# Patient Record
Sex: MALE | Race: WHITE | NOT HISPANIC OR LATINO | Employment: UNEMPLOYED | URBAN - METROPOLITAN AREA
[De-identification: names, ages, dates, MRNs, and addresses within clinical notes are randomized per-mention and may not be internally consistent; named-entity substitution may affect disease eponyms.]

---

## 2017-02-17 ENCOUNTER — HOSPITAL ENCOUNTER (EMERGENCY)
Facility: HOSPITAL | Age: 22
Discharge: HOME | End: 2017-02-17
Attending: EMERGENCY MEDICINE | Admitting: EMERGENCY MEDICINE

## 2017-02-17 VITALS
WEIGHT: 198 LBS | HEIGHT: 69 IN | BODY MASS INDEX: 29.33 KG/M2 | HEART RATE: 73 BPM | OXYGEN SATURATION: 95 % | RESPIRATION RATE: 16 BRPM | DIASTOLIC BLOOD PRESSURE: 96 MMHG | SYSTOLIC BLOOD PRESSURE: 117 MMHG | TEMPERATURE: 97.9 F

## 2017-02-17 DIAGNOSIS — IMO0001 PARESTHESIAS/NUMBNESS: Primary | ICD-10-CM

## 2017-02-17 DIAGNOSIS — E87.6 HYPOKALEMIA: ICD-10-CM

## 2017-02-17 LAB
ANION GAP SERPL CALCULATED.3IONS-SCNC: 11.2 MMOL/L
BUN BLD-MCNC: 12 MG/DL (ref 6–20)
BUN/CREAT SERPL: 14.5 (ref 7–25)
CALCIUM SPEC-SCNC: 9.5 MG/DL (ref 8.6–10.5)
CHLORIDE SERPL-SCNC: 102 MMOL/L (ref 98–107)
CO2 SERPL-SCNC: 27.8 MMOL/L (ref 22–29)
CREAT BLD-MCNC: 0.83 MG/DL (ref 0.76–1.27)
GFR SERPL CREATININE-BSD FRML MDRD: 119 ML/MIN/1.73
GLUCOSE BLD-MCNC: 116 MG/DL (ref 65–99)
POTASSIUM BLD-SCNC: 3.4 MMOL/L (ref 3.5–5.2)
SODIUM BLD-SCNC: 141 MMOL/L (ref 136–145)

## 2017-02-17 PROCEDURE — 93010 ELECTROCARDIOGRAM REPORT: CPT | Performed by: INTERNAL MEDICINE

## 2017-02-17 PROCEDURE — 99284 EMERGENCY DEPT VISIT MOD MDM: CPT

## 2017-02-17 PROCEDURE — 36415 COLL VENOUS BLD VENIPUNCTURE: CPT

## 2017-02-17 PROCEDURE — 80048 BASIC METABOLIC PNL TOTAL CA: CPT | Performed by: EMERGENCY MEDICINE

## 2017-02-17 PROCEDURE — 93005 ELECTROCARDIOGRAM TRACING: CPT | Performed by: EMERGENCY MEDICINE

## 2017-02-17 RX ORDER — POTASSIUM CHLORIDE 750 MG/1
40 CAPSULE, EXTENDED RELEASE ORAL ONCE
Status: COMPLETED | OUTPATIENT
Start: 2017-02-17 | End: 2017-02-17

## 2017-02-17 RX ADMIN — POTASSIUM CHLORIDE 40 MEQ: 750 CAPSULE, EXTENDED RELEASE ORAL at 19:41

## 2017-02-17 NOTE — ED NOTES
Tingling all over sudden onset at 1500 while sitting in the car. Denies chest pain, states he has not been working outside or over heated. Pt has hx of anxiety and panic attacks states it feels different this time.     James Zarate RN  02/17/17 5094

## 2017-02-17 NOTE — ED PROVIDER NOTES
EMERGENCY DEPARTMENT ENCOUNTER    CHIEF COMPLAINT  Chief Complaint: tingling   History given by: pt, pt's aunt  History limited by: none  Room Number: 11/11  PMD: LESA Murray      HPI:  Pt is a 21 y.o. male who presents complaining of tingling and numbness all over onset two hours ago while sitting in the car. He reports the tingling began in his abd and then went all over his body. Tingling and numbness have now resolved and lasted about 2 hours. He c/o cough onset last night. He denies fever, CP, weight change, vomiting and other complaints at this time. Hx of panic attacks and heart surgery (PFO repair) as a child.    Duration:  2 hours   Onset: gradual   Timing: constant   Location: generalized   Radiation: none  Quality: numbness and tingling  Intensity/Severity: moderate   Progression: resolved   Associated Symptoms: cough onset last night   Aggravating Factors: none  Alleviating Factors: none  Previous Episodes: Pt reports hx of panic attacks.   Treatment before arrival: none reported     PAST MEDICAL HISTORY  Active Ambulatory Problems     Diagnosis Date Noted   • No Active Ambulatory Problems     Resolved Ambulatory Problems     Diagnosis Date Noted   • No Resolved Ambulatory Problems     Past Medical History   Diagnosis Date   • Anxiety        PAST SURGICAL HISTORY  History reviewed. No pertinent past surgical history.    FAMILY HISTORY  History reviewed. No pertinent family history.    SOCIAL HISTORY  Social History     Social History   • Marital status: Single     Spouse name: N/A   • Number of children: N/A   • Years of education: N/A     Occupational History   • Not on file.     Social History Main Topics   • Smoking status: Current Every Day Smoker     Packs/day: 1.00     Types: Cigarettes   • Smokeless tobacco: Not on file   • Alcohol use No   • Drug use: Not on file   • Sexual activity: Not on file     Other Topics Concern   • Not on file     Social History Narrative   • No  narrative on file       ALLERGIES  Amoxicillin and Penicillins    REVIEW OF SYSTEMS  Review of Systems   Constitutional: Negative.  Negative for fever and unexpected weight change.   Respiratory: Positive for cough. Negative for shortness of breath.    Cardiovascular: Negative.  Negative for chest pain.   Gastrointestinal: Negative.  Negative for abdominal pain.   Genitourinary: Negative.    Musculoskeletal: Negative.    Neurological: Positive for numbness (and tingling).   All other systems reviewed and are negative.      PHYSICAL EXAM  ED Triage Vitals   Temp Heart Rate Resp BP SpO2   02/17/17 1614 02/17/17 1614 02/17/17 1614 02/17/17 1629 02/17/17 1614   98.7 °F (37.1 °C) 78 18 155/72 98 %      Temp src Heart Rate Source Patient Position BP Location FiO2 (%)   02/17/17 1614 02/17/17 1614 02/17/17 1629 02/17/17 1629 --   Tympanic Monitor Sitting Right arm        Physical Exam   Constitutional: He is oriented to person, place, and time and well-developed, well-nourished, and in no distress.   HENT:   Head: Normocephalic and atraumatic.   Mouth/Throat: Oropharynx is clear and moist.   Eyes: EOM are normal. Pupils are equal, round, and reactive to light.   Neck: Normal range of motion. Neck supple.   Cardiovascular: Normal rate, regular rhythm and normal heart sounds.    No murmur heard.  Pulmonary/Chest: Effort normal and breath sounds normal. No respiratory distress.   Abdominal: Soft. There is no tenderness. There is no rebound and no guarding.   Musculoskeletal: Normal range of motion. He exhibits no edema.   Neurological: He is alert and oriented to person, place, and time. He has normal sensation and normal strength.   Skin: Skin is warm and dry.   Psychiatric: Mood and affect normal.   Nursing note and vitals reviewed.      LAB RESULTS  Lab Results (last 24 hours)     Procedure Component Value Units Date/Time    Basic Metabolic Panel [[id number]]  (Abnormal) Collected:  02/17/17 1759    Specimen:  Blood Updated:   02/17/17 1831     Glucose 116 (H) mg/dL      BUN 12 mg/dL      Creatinine 0.83 mg/dL      Sodium 141 mmol/L      Potassium 3.4 (L) mmol/L      Chloride 102 mmol/L      CO2 27.8 mmol/L      Calcium 9.5 mg/dL      eGFR Non African Amer 119 mL/min/1.73      BUN/Creatinine Ratio 14.5      Anion Gap 11.2 mmol/L     Narrative:       GFR Normal >60  Chronic Kidney Disease <60  Kidney Failure <15          I ordered the above labs and reviewed the results  PROCEDURES  Procedures  EKG           EKG time: 17:43  Rhythm/Rate: NSR, 80  P waves and MI: normal  QRS, axis: normal   ST and T waves: normal     Interpreted Contemporaneously by me, independently viewed  No prior available       PROGRESS AND CONSULTS  ED Course   Comment By Time   5:40 PM  22 yo with prior heart surgery as infant (PFO repair) presents after having about 1.5 hrs on generalized body tingling, now resolved.  Exam currently benign, no murmurs.  Will check EKG, labs. Mervin Thorpe MD 02/17 1741     17:37 Ordered blood work and EKG for further evaluation.       MEDICAL DECISION MAKING  Results were reviewed/discussed with the patient and they were also made aware of online access. Pt also made aware that some labs, such as cultures, will not be resulted during ER visit and follow up with PMD is necessary.     MDM  Number of Diagnoses or Management Options  Hypokalemia:   Paresthesias/numbness:      Amount and/or Complexity of Data Reviewed  Clinical lab tests: ordered and reviewed (Hypokalemia - K of 3.4)  Tests in the medicine section of CPT®: reviewed and ordered (EKG - normal, NSR rate 80)  Decide to obtain previous medical records or to obtain history from someone other than the patient: yes  Obtain history from someone other than the patient: yes (Pt's aunt  )  Review and summarize past medical records: yes (No prior visits to [facility] ED.)    Patient Progress  Patient progress: stable         DIAGNOSIS  Final diagnoses:   Paresthesias/numbness    Hypokalemia       DISPOSITION  DISCHARGE    Patient discharged in stable condition.    Reviewed implications of results, diagnosis, meds, responsibility to follow up, warning signs and symptoms of possible worsening, potential complications and reasons to return to the ED.    Patient/Family voiced understanding of above instructions.    Discussed plan for discharge, as there is no emergent indication for admission.  Pt/family is agreeable and understands need for follow up and repeat testing.  Pt is aware that discharge does not mean that nothing is wrong but it indicates no emergency is present that requires admission and they must continue care with follow-up as given below or physician of their choice.     FOLLOW-UP  Payton Mercado, APRN  [address] [address]  [place] [address]  [phone]    In 3 days  If Not Better         Medication List      Notice     No changes were made to your prescriptions during this visit.            Latest Documented Vital Signs:  As of 6:46 PM  BP- 147/61 HR- 83 Temp- 98.8 °F (37.1 °C) (Tympanic) O2 sat- 98%    --  Documentation assistance provided by monty Mack for Dr. Boothe.  Information recorded by the scribe was done at my direction and has been verified and validated by me.        Dee Mack  02/17/17 8276       Mervin Thorpe MD  02/17/17 8310

## 2017-06-06 ENCOUNTER — OFFICE VISIT (OUTPATIENT)
Dept: SURGERY | Facility: CLINIC | Age: 22
End: 2017-06-06

## 2017-06-06 VITALS
HEIGHT: 69 IN | DIASTOLIC BLOOD PRESSURE: 72 MMHG | SYSTOLIC BLOOD PRESSURE: 116 MMHG | BODY MASS INDEX: 29.47 KG/M2 | WEIGHT: 199 LBS

## 2017-06-06 DIAGNOSIS — R10.33 PERIUMBILICAL PAIN: Primary | ICD-10-CM

## 2017-06-06 PROCEDURE — 99202 OFFICE O/P NEW SF 15 MIN: CPT | Performed by: SURGERY

## 2017-06-06 NOTE — PROGRESS NOTES
"    PATIENT INFORMATION  Darien Stanley  Possible umb hernia x 1 year, painful, has increased in size, no imaging     - 1995    CHIEF COMPLAINT  Chief Complaint   Patient presents with   • Hernia       HISTORY OF PRESENT ILLNESS  HPI  Complains of pain around his navel for one year.  Says is some burning sensation.  He denies any swelling in the area.  This has not been imaged.      REVIEW OF SYSTEMS  Review of Systems   Constitutional: Negative.    Eyes: Negative.    Respiratory: Negative.    Cardiovascular: Negative.    Gastrointestinal: Positive for abdominal pain.   Endocrine: Negative.    Genitourinary: Negative.    Musculoskeletal: Negative.    Skin: Negative.    Allergic/Immunologic: Negative.    Neurological: Positive for headaches.   Hematological: Negative.    Psychiatric/Behavioral: The patient is nervous/anxious.          ACTIVE PROBLEMS  There are no active problems to display for this patient.        PAST MEDICAL HISTORY  Past Medical History:   Diagnosis Date   • Anxiety          SURGICAL HISTORY  History reviewed. No pertinent surgical history.      FAMILY HISTORY  Family History   Problem Relation Age of Onset   • Diabetes Maternal Grandmother    • Hypertension Maternal Grandmother          SOCIAL HISTORY  Social History     Occupational History   • Not on file.     Social History Main Topics   • Smoking status: Current Every Day Smoker     Packs/day: 1.00     Types: Cigarettes   • Smokeless tobacco: Not on file   • Alcohol use No   • Drug use: Not on file   • Sexual activity: Not on file         CURRENT MEDICATIONS  No current outpatient prescriptions on file.    ALLERGIES  Amoxicillin and Penicillins    VITALS  Vitals:    17 0942   BP: 116/72   Weight: 199 lb (90.3 kg)   Height: 69\" (175.3 cm)       LAST RESULTS   Admission on 2017, Discharged on 2017   Component Date Value Ref Range Status   • Glucose 2017 116* 65 - 99 mg/dL Final   • BUN 2017 12  6 - 20 mg/dL " Final   • Creatinine 02/17/2017 0.83  0.76 - 1.27 mg/dL Final   • Sodium 02/17/2017 141  136 - 145 mmol/L Final   • Potassium 02/17/2017 3.4* 3.5 - 5.2 mmol/L Final   • Chloride 02/17/2017 102  98 - 107 mmol/L Final   • CO2 02/17/2017 27.8  22.0 - 29.0 mmol/L Final   • Calcium 02/17/2017 9.5  8.6 - 10.5 mg/dL Final   • eGFR Non African Amer 02/17/2017 119  >60 mL/min/1.73 Final   • BUN/Creatinine Ratio 02/17/2017 14.5  7.0 - 25.0 Final   • Anion Gap 02/17/2017 11.2  mmol/L Final     No results found.    PHYSICAL EXAM  Physical Exam's alert white male in no active distress he has irregular skin inside his navel.  I was unable to appreciate an impulse or mass today.    ASSESSMENT  Periumbilical pain      PLAN  We will recheck him after we have performed a CT scan of his abdomen.

## 2017-06-09 ENCOUNTER — HOSPITAL ENCOUNTER (OUTPATIENT)
Dept: CT IMAGING | Facility: HOSPITAL | Age: 22
Discharge: HOME | End: 2017-06-09
Attending: SURGERY | Admitting: SURGERY

## 2017-06-09 DIAGNOSIS — R10.33 PERIUMBILICAL PAIN: ICD-10-CM

## 2017-06-09 PROCEDURE — 74150 CT ABDOMEN W/O CONTRAST: CPT

## 2017-06-13 ENCOUNTER — OFFICE VISIT (OUTPATIENT)
Dept: SURGERY | Facility: CLINIC | Age: 22
End: 2017-06-13

## 2017-06-13 VITALS
DIASTOLIC BLOOD PRESSURE: 92 MMHG | HEIGHT: 69 IN | WEIGHT: 199 LBS | SYSTOLIC BLOOD PRESSURE: 142 MMHG | BODY MASS INDEX: 29.47 KG/M2

## 2017-06-13 DIAGNOSIS — R10.9 ABDOMINAL WALL PAIN: Primary | ICD-10-CM

## 2017-06-13 PROCEDURE — 99211 OFF/OP EST MAY X REQ PHY/QHP: CPT | Performed by: SURGERY

## 2017-06-13 NOTE — PROGRESS NOTES
F/U ABD PAIN, CT DONE, SX UNCHANGED SINCE LAST OV  He still complains of periumbilical pain.  Physical exam I am again unable to appreciate an umbilical or periumbilical hernia.  CT scan was read by radiologist and the films were reviewed by me.  I see no evidence of abdominal wall hernia.  Discussed was benefits and options with him in detail.  I recommended over-the-counter nonsteroidals for symptom relief.  He will call if this is ineffective.  If it is ineffective we could possibly explore the area looking for a small urachal sinus.

## 2022-01-28 NOTE — ED NOTES
No orders at this time per MARYURI Bear.     Darryn Mckenzie, RN  02/17/17 6621     Thanks for getting back to us.  Serrated adenomas are polyps that have the potential to become colon cancer  They can be considered precancerous types of polyps.  The fact your dad had his colon removed might have been due to number of these polyps or whether there was dysplasia within the polyp making it more risky or to decrease risk of cancer or  to decrease need for frequent colonoscopies. Im not sure  Genetic testing currently not recommended as basis of serrated adenoma polyposis is generally not known  In the absence of evidence to guide colorectal screening recommendations for family members, we begin screening first-degree relatives of individuals with colon polyps around age 40 (or 10 years earlier than the earliest age at presentation of the serrated adenoma polyps in the family) We continue surveillance every five years if no polyps are detected.   I can put a referral in to GI for a colonoscopy if you'd like or if you would like to check with your insurance first and then get back to use.  Let us know

## 2023-11-28 ENCOUNTER — HOSPITAL ENCOUNTER (EMERGENCY)
Facility: HOSPITAL | Age: 28
Discharge: HOME | End: 2023-11-28
Attending: EMERGENCY MEDICINE | Admitting: EMERGENCY MEDICINE
Payer: COMMERCIAL

## 2023-11-28 ENCOUNTER — APPOINTMENT (OUTPATIENT)
Dept: GENERAL RADIOLOGY | Facility: HOSPITAL | Age: 28
End: 2023-11-28
Payer: COMMERCIAL

## 2023-11-28 VITALS
RESPIRATION RATE: 16 BRPM | BODY MASS INDEX: 28.14 KG/M2 | WEIGHT: 190 LBS | DIASTOLIC BLOOD PRESSURE: 83 MMHG | HEIGHT: 69 IN | HEART RATE: 85 BPM | OXYGEN SATURATION: 96 % | TEMPERATURE: 98 F | SYSTOLIC BLOOD PRESSURE: 109 MMHG

## 2023-11-28 DIAGNOSIS — S81.012A LACERATION OF LEFT KNEE, INITIAL ENCOUNTER: Primary | ICD-10-CM

## 2023-11-28 PROCEDURE — 90471 IMMUNIZATION ADMIN: CPT

## 2023-11-28 PROCEDURE — 25010000002 TETANUS-DIPHTH-ACELL PERTUSSIS 5-2.5-18.5 LF-MCG/0.5 SUSPENSION PREFILLED SYRINGE

## 2023-11-28 PROCEDURE — 90715 TDAP VACCINE 7 YRS/> IM: CPT

## 2023-11-28 PROCEDURE — 73562 X-RAY EXAM OF KNEE 3: CPT

## 2023-11-28 PROCEDURE — 99282 EMERGENCY DEPT VISIT SF MDM: CPT

## 2023-11-28 PROCEDURE — 25010000002 BUPIVACAINE (PF) 0.5 % SOLUTION

## 2023-11-28 RX ORDER — BUPIVACAINE HYDROCHLORIDE 5 MG/ML
10 INJECTION, SOLUTION EPIDURAL; INTRACAUDAL ONCE
Status: COMPLETED | OUTPATIENT
Start: 2023-11-28 | End: 2023-11-28

## 2023-11-28 RX ORDER — IBUPROFEN 200 MG
1 TABLET ORAL 3 TIMES DAILY
Qty: 21 G | Refills: 0 | Status: SHIPPED | OUTPATIENT
Start: 2023-11-28 | End: 2023-12-05

## 2023-11-28 RX ORDER — LIDOCAINE HYDROCHLORIDE 20 MG/ML
10 INJECTION, SOLUTION INFILTRATION; PERINEURAL ONCE
Status: COMPLETED | OUTPATIENT
Start: 2023-11-28 | End: 2023-11-28

## 2023-11-28 RX ADMIN — TETANUS TOXOID, REDUCED DIPHTHERIA TOXOID AND ACELLULAR PERTUSSIS VACCINE, ADSORBED 0.5 ML: 5; 2.5; 8; 8; 2.5 SUSPENSION INTRAMUSCULAR at 14:26

## 2023-11-28 RX ADMIN — BUPIVACAINE HYDROCHLORIDE 10 ML: 5 INJECTION, SOLUTION EPIDURAL; INTRACAUDAL; PERINEURAL at 14:00

## 2023-11-28 RX ADMIN — LIDOCAINE HYDROCHLORIDE 10 ML: 20 INJECTION, SOLUTION INFILTRATION; PERINEURAL at 14:00

## 2023-11-28 NOTE — ED PROVIDER NOTES
EMERGENCY DEPARTMENT ENCOUNTER      Room Number: 11/11    History is provided by the patient, no translation services needed    HPI:    Chief complaint: Laceration    Location: Above the left knee    Quality/Severity: The patient admits to a mild stinging pain at this time.    Timing/Duration: 20 minutes prior to arrival    Modifying Factors: None    Associated Symptoms: None    Narrative: Pt is a 28 y.o. male who presents complaining of laceration above his left knee x 20 minutes prior to arrival.  The patient advises that he was using a chainsaw to cut a tree.  He states that the chainsaw got stuck and kicked back, striking just above his left knee.  He admits to mild stinging pain at this time.  He denies any other injuries.  He states that he immediately got into his truck and came to the ED to be evaluated further.      PMD: Payton Mercado APRN    REVIEW OF SYSTEMS  Review of Systems   Constitutional:  Negative for fever.   Eyes:  Negative for visual disturbance.   Respiratory:  Negative for cough and shortness of breath.    Cardiovascular:  Negative for chest pain.   Gastrointestinal:  Negative for abdominal pain, nausea and vomiting.   Musculoskeletal:  Negative for back pain and neck pain.   Skin:  Positive for wound (Laceration above the left knee). Negative for color change, pallor and rash.   Neurological:  Negative for dizziness, syncope, weakness, numbness and headaches.   Psychiatric/Behavioral:  Negative for confusion. The patient is not nervous/anxious.          PAST MEDICAL HISTORY  Active Ambulatory Problems     Diagnosis Date Noted    No Active Ambulatory Problems     Resolved Ambulatory Problems     Diagnosis Date Noted    No Resolved Ambulatory Problems     Past Medical History:   Diagnosis Date    Anxiety        PAST SURGICAL HISTORY  History reviewed. No pertinent surgical history.    FAMILY HISTORY  Family History   Problem Relation Age of Onset    Diabetes Maternal Grandmother      Hypertension Maternal Grandmother        SOCIAL HISTORY  Social History     Socioeconomic History    Marital status: Single   Tobacco Use    Smoking status: Every Day     Packs/day: 1     Types: Cigarettes   Substance and Sexual Activity    Alcohol use: No       ALLERGIES  Amoxicillin and Penicillins      Current Facility-Administered Medications:     bupivacaine (PF) (MARCAINE) 0.5 % injection 10 mL, 10 mL, Injection, Once, Mary Lou Carter PA-C    lidocaine (XYLOCAINE) 2% injection 10 mL, 10 mL, Injection, Once, Mary Lou Carter PA-C    Tetanus-Diphth-Acell Pertussis (BOOSTRIX) injection 0.5 mL, 0.5 mL, Intramuscular, Once, Mary Lou Carter PA-C    Current Outpatient Medications:     neomycin-bacitracin-polymyxin (NEOSPORIN) 5-400-5000 ointment, Apply 1 application  topically to the appropriate area as directed 3 (Three) Times a Day for 7 days., Disp: 21 g, Rfl: 0    PHYSICAL EXAM  ED Triage Vitals   Temp Pulse Resp BP SpO2   -- -- -- -- --      Temp src Heart Rate Source Patient Position BP Location FiO2 (%)   -- -- -- -- --       Physical Exam  Vitals and nursing note reviewed.   Constitutional:       General: He is not in acute distress.     Appearance: Normal appearance. He is not ill-appearing, toxic-appearing or diaphoretic.   HENT:      Head: Normocephalic and atraumatic.      Nose: Nose normal. No congestion or rhinorrhea.      Mouth/Throat:      Mouth: Mucous membranes are moist.      Pharynx: Oropharynx is clear.   Eyes:      General: No scleral icterus.        Right eye: No discharge.         Left eye: No discharge.      Extraocular Movements: Extraocular movements intact.      Conjunctiva/sclera: Conjunctivae normal.      Pupils: Pupils are equal, round, and reactive to light.   Cardiovascular:      Rate and Rhythm: Normal rate and regular rhythm.      Pulses: Normal pulses.   Pulmonary:      Effort: Pulmonary effort is normal. No respiratory distress.   Abdominal:      General: There is no  distension.      Palpations: Abdomen is soft.   Musculoskeletal:         General: Signs of injury (Laceration above the left knee) present. No swelling, tenderness or deformity. Normal range of motion.      Cervical back: Normal range of motion and neck supple. No rigidity.      Right lower leg: No edema.      Left lower leg: No edema.   Skin:     General: Skin is warm and dry.      Coloration: Skin is not jaundiced or pale.      Findings: No bruising, erythema, lesion or rash.   Neurological:      Mental Status: He is alert and oriented to person, place, and time.      Motor: No weakness.      Coordination: Coordination normal.   Psychiatric:         Mood and Affect: Mood and affect normal.           LAB RESULTS  Lab Results (last 24 hours)       ** No results found for the last 24 hours. **              RADIOLOGY  XR Knee 3 View Left    Result Date: 11/28/2023  XR KNEE 3 VW LEFT-: 11/28/2023 2:35 PM  INDICATION: Laceration from chainsaw just superior to the patella.  COMPARISON: None available.  FINDINGS: 3 view(s) of the left knee.  No fracture, dislocation, or effusion. No bone erosion or destruction.  No foreign body.      Negative left knee.  This report was finalized on 11/28/2023 1:38 PM by Dr. Bib Gibson MD.       I ordered the above radiologic testing and reviewed the results    PROCEDURES  Procedures  Laceration repair:  Discussed risks to include bleeding, infection, further tissue damage, benefits to include improved wound healing, and alternatives to include no closure, altered closure techniques with patient/parents of the patient/guardian.  Expressed verbal consent for procedure.  6 cm laceration located just above the left knee.  Wound is anesthetized with lidocaine 2% and marcaine, cleansed with chlorhexidene, and irrigated copiously.  Wound explored.  Simple single layer laceration closed with 4-0 Ethilon in an interrupted fashion requiring 8 sutures.  Well-tolerated.  No immediate complications  identified.  Reviewed wound care, follow-up for suture removal, and red flags which would indicate need for reevaluation of the wound.       PROGRESS AND CONSULTS  ED Course as of 11/28/23 1410   Tue Nov 28, 2023   1310 The patient appears well.  He has a laceration noted above the left knee.  X-rays ordered to evaluate further.  Plan to close the wound with sutures. He is neurovascularly intact. Pulses are 2+. [AH]   1407 Laceration repair:  Discussed risks to include bleeding, infection, further tissue damage, benefits to include improved wound healing, and alternatives to include no closure, altered closure techniques with patient/parents of the patient/guardian.  Expressed verbal consent for procedure.  6 cm laceration located just above the left knee.  Wound is anesthetized with lidocaine 2% and marcaine, cleansed with chlorhexidene, and irrigated copiously.  Wound explored.  Simple single layer laceration closed with 4-0 Ethilon in an interrupted fashion requiring 8 sutures.  Well-tolerated.  No immediate complications identified.  Reviewed wound care, follow-up for suture removal, and red flags which would indicate need for reevaluation of the wound.    [AH]   1408 Patient is unsure when his last tetanus vaccination was. I have ordered one for this visit. [AH]   1408 Results and findings discussed in depth with the patient. He expressed understanding. Follow up instructions given. Return to the ED instructions given. []      ED Course User Index  [AH] Mary Lou Carter PA-C           MEDICAL DECISION MAKING    MDM       My diagnosis for lower extremity pain and injury includes but is not limited to hip fracture, femur fracture, hip dislocation, hip contusion, hip sprain, hip strain, pelvic fracture, ischio-tibial band pain, ischio-tibial band bursitis, knee sprain, patella dislocation, knee dislocation, internal derangement of knee, fractures of the femur, tibia, fibula, ankle, foot and digits, ankle sprain,  ankle dislocation, Lisfranc fracture, fracture dislocations of the digits, pulmonary embolism, claudication, peripheral vascular disease, gout, osteoarthritis, rheumatoid arthritis, bursitis, septic joint, poly-rheumatica, polyarthralgia and other inflammatory or infectious disease processes.   DIAGNOSIS  Final diagnoses:   Laceration of left knee, initial encounter       Latest Documented Vital Signs:  As of 14:10 EST  BP- 109/83 HR- 85 Temp- 98 °F (36.7 °C) O2 sat- 96%    DISPOSITION  Pt discharged    Discussed pertinent findings with the patient/family.  Patient/Family voiced understanding of need to follow-up for recheck and further testing as needed.  Return to the Emergency Department warnings were given.         Medication List        New Prescriptions      neomycin-bacitracin-polymyxin 5-400-5000 ointment  Apply 1 application  topically to the appropriate area as directed 3 (Three) Times a Day for 7 days.               Where to Get Your Medications        These medications were sent to [facility] [id number] [facility] [address] [facility] [address] - [phone] [facility] [phone]   [address] [address]      Phone: [phone]   neomycin-bacitracin-polymyxin 5-400-5000 ointment              Follow-up Information       Payton Mercado APRN. Call today.    Specialty: Family Medicine  Why: to schedule follow up  Contact information:  [address]  [facility]  [place] [address] [phone]               Go to  [facility] EMERGENCY DEPARTMENT.    Specialty: Emergency Medicine  Why: If symptoms worsen  Contact information:  [address]  [place] [address] [phone]                             Dictated utilizing Dragon dictation     Mary Lou Carter PA-C  11/28/23 6490

## 2023-11-28 NOTE — DISCHARGE INSTRUCTIONS
Keep wound clean and dry for 24 hours.  Then wash 3 times daily with antibacterial soap, pat dry and apply Neosporin or Bacitracin.  Continue until healed. Follow up with your PCP in 2-3 days for a wound check; in 10 days for suture removal. Sooner for signs of infection.  Return to ED for signs of infection, medical emergencies